# Patient Record
Sex: FEMALE | Race: WHITE | NOT HISPANIC OR LATINO | URBAN - METROPOLITAN AREA
[De-identification: names, ages, dates, MRNs, and addresses within clinical notes are randomized per-mention and may not be internally consistent; named-entity substitution may affect disease eponyms.]

---

## 2024-12-03 ENCOUNTER — OFFICE VISIT (OUTPATIENT)
Dept: URGENT CARE | Age: 34
End: 2024-12-03
Payer: COMMERCIAL

## 2024-12-03 VITALS
OXYGEN SATURATION: 95 % | HEART RATE: 81 BPM | BODY MASS INDEX: 36.71 KG/M2 | RESPIRATION RATE: 18 BRPM | DIASTOLIC BLOOD PRESSURE: 79 MMHG | HEIGHT: 60 IN | WEIGHT: 187 LBS | TEMPERATURE: 97.8 F | SYSTOLIC BLOOD PRESSURE: 127 MMHG

## 2024-12-03 DIAGNOSIS — J02.9 ACUTE PHARYNGITIS, UNSPECIFIED ETIOLOGY: Primary | ICD-10-CM

## 2024-12-03 DIAGNOSIS — J02.9 SORE THROAT: ICD-10-CM

## 2024-12-03 LAB
POC RAPID STREP: NEGATIVE
POC SARS-COV-2 AG BINAX: NORMAL

## 2024-12-03 RX ORDER — AMOXICILLIN 875 MG/1
875 TABLET, FILM COATED ORAL 2 TIMES DAILY
Qty: 20 TABLET | Refills: 0 | Status: SHIPPED | OUTPATIENT
Start: 2024-12-03 | End: 2024-12-13

## 2024-12-03 ASSESSMENT — ENCOUNTER SYMPTOMS
EYES NEGATIVE: 1
SORE THROAT: 1
COUGH: 1

## 2024-12-03 NOTE — PROGRESS NOTES
Subjective   Patient ID: Jennifer Perez is a 34 y.o. female. They present today with a chief complaint of Sore Throat (Sore throat,, fever, cough).    History of Present Illness    Sore Throat   Associated symptoms include coughing.       Past Medical History  Allergies as of 12/03/2024    (No Known Allergies)       (Not in a hospital admission)       History reviewed. No pertinent past medical history.    History reviewed. No pertinent surgical history.         Review of Systems  Review of Systems   HENT:  Positive for sore throat.    Eyes: Negative.    Respiratory:  Positive for cough.                                   Objective    Vitals:    12/03/24 0953   BP: 127/79   BP Location: Left arm   Patient Position: Sitting   BP Cuff Size: Adult   Pulse: 81   Resp: 18   Temp: 36.6 °C (97.8 °F)   SpO2: 95%   Weight: 84.8 kg (187 lb)   Height: 1.524 m (5')     No LMP recorded.    Physical Exam  Constitutional:       Appearance: Normal appearance.   HENT:      Right Ear: Tympanic membrane normal.      Left Ear: Tympanic membrane normal.      Mouth/Throat:      Pharynx: Pharyngeal swelling and posterior oropharyngeal erythema present.      Tonsils: Tonsillar exudate present. 1+ on the right. 1+ on the left.   Neurological:      Mental Status: She is alert.         Procedures    Point of Care Test & Imaging Results from this visit  Results for orders placed or performed in visit on 12/03/24   POCT BinaxNOW Covid-19 Ag Card manually resulted   Result Value Ref Range    POC CAROLE-COV-2 AG  Presumptive negative test for SARS-CoV-2 (no antigen detected)     Presumptive negative test for SARS-CoV-2 (no antigen detected)   POCT rapid strep A manually resulted   Result Value Ref Range    POC Rapid Strep Negative Negative      No results found.    Diagnostic study results (if any) were reviewed by Kelly Galicia MD.    Assessment/Plan   Allergies, medications, history, and pertinent labs/EKGs/Imaging reviewed by Kelly Galicia  MD.     Medical Decision Making      Orders and Diagnoses  Diagnoses and all orders for this visit:  Sore throat  -     POCT BinaxNOW Covid-19 Ag Card manually resulted  -     POCT rapid strep A manually resulted      Medical Admin Record      Patient disposition: Home    Electronically signed by Kelly Galicia MD  10:11 AM

## 2024-12-03 NOTE — LETTER
December 3, 2024     Patient: Jennifer Perez   YOB: 1990   Date of Visit: 12/3/2024       To Whom It May Concern:    Jennifer Perez was seen in my clinic on 12/3/2024 at 9:30 am. Please excuse Jennifer for her absence from work on this day to make the appointment.  She can return to work Friday 12/6/2024.    If you have any questions or concerns, please don't hesitate to call.         Sincerely,         Kelly Galicia MD        CC: No Recipients

## 2024-12-03 NOTE — PATIENT INSTRUCTIONS
Fluids   New tooth brush in 10  days   Take Probiotics and or eat yogurt for 2 weeks  Gargle with warm salt water

## 2025-03-21 ENCOUNTER — TELEPHONE (OUTPATIENT)
Dept: PRIMARY CARE | Facility: CLINIC | Age: 35
End: 2025-03-21

## 2025-03-21 ENCOUNTER — DOCUMENTATION (OUTPATIENT)
Dept: PRIMARY CARE | Facility: CLINIC | Age: 35
End: 2025-03-21

## 2025-03-21 ENCOUNTER — OFFICE VISIT (OUTPATIENT)
Dept: PRIMARY CARE | Facility: CLINIC | Age: 35
End: 2025-03-21
Payer: COMMERCIAL

## 2025-03-21 VITALS
DIASTOLIC BLOOD PRESSURE: 76 MMHG | BODY MASS INDEX: 38.48 KG/M2 | TEMPERATURE: 97 F | WEIGHT: 196 LBS | HEIGHT: 60 IN | SYSTOLIC BLOOD PRESSURE: 132 MMHG | OXYGEN SATURATION: 98 % | HEART RATE: 74 BPM

## 2025-03-21 DIAGNOSIS — Z71.85 VACCINE COUNSELING: ICD-10-CM

## 2025-03-21 DIAGNOSIS — Z13.6 SCREENING FOR CARDIOVASCULAR CONDITION: ICD-10-CM

## 2025-03-21 DIAGNOSIS — Z83.49 FAMILY HISTORY OF THYROID DISEASE IN MOTHER: ICD-10-CM

## 2025-03-21 DIAGNOSIS — Z23 ENCOUNTER FOR IMMUNIZATION: ICD-10-CM

## 2025-03-21 DIAGNOSIS — E55.9 VITAMIN D DEFICIENCY: ICD-10-CM

## 2025-03-21 DIAGNOSIS — Z00.00 ANNUAL PHYSICAL EXAM: Primary | ICD-10-CM

## 2025-03-21 DIAGNOSIS — Z30.09 ENCOUNTER FOR OTHER GENERAL COUNSELING AND ADVICE ON CONTRACEPTION: ICD-10-CM

## 2025-03-21 PROCEDURE — 90471 IMMUNIZATION ADMIN: CPT | Performed by: STUDENT IN AN ORGANIZED HEALTH CARE EDUCATION/TRAINING PROGRAM

## 2025-03-21 PROCEDURE — 99385 PREV VISIT NEW AGE 18-39: CPT | Performed by: STUDENT IN AN ORGANIZED HEALTH CARE EDUCATION/TRAINING PROGRAM

## 2025-03-21 PROCEDURE — 99203 OFFICE O/P NEW LOW 30 MIN: CPT | Performed by: STUDENT IN AN ORGANIZED HEALTH CARE EDUCATION/TRAINING PROGRAM

## 2025-03-21 PROCEDURE — 3008F BODY MASS INDEX DOCD: CPT | Performed by: STUDENT IN AN ORGANIZED HEALTH CARE EDUCATION/TRAINING PROGRAM

## 2025-03-21 PROCEDURE — 90715 TDAP VACCINE 7 YRS/> IM: CPT | Performed by: STUDENT IN AN ORGANIZED HEALTH CARE EDUCATION/TRAINING PROGRAM

## 2025-03-21 ASSESSMENT — PATIENT HEALTH QUESTIONNAIRE - PHQ9
SUM OF ALL RESPONSES TO PHQ QUESTIONS 1-9: 9
2. FEELING DOWN, DEPRESSED OR HOPELESS: SEVERAL DAYS
3. TROUBLE FALLING OR STAYING ASLEEP OR SLEEPING TOO MUCH: SEVERAL DAYS
5. POOR APPETITE OR OVEREATING: MORE THAN HALF THE DAYS
1. LITTLE INTEREST OR PLEASURE IN DOING THINGS: MORE THAN HALF THE DAYS
4. FEELING TIRED OR HAVING LITTLE ENERGY: SEVERAL DAYS
7. TROUBLE CONCENTRATING ON THINGS, SUCH AS READING THE NEWSPAPER OR WATCHING TELEVISION: SEVERAL DAYS
8. MOVING OR SPEAKING SO SLOWLY THAT OTHER PEOPLE COULD HAVE NOTICED. OR THE OPPOSITE, BEING SO FIGETY OR RESTLESS THAT YOU HAVE BEEN MOVING AROUND A LOT MORE THAN USUAL: NOT AT ALL
9. THOUGHTS THAT YOU WOULD BE BETTER OFF DEAD, OR OF HURTING YOURSELF: NOT AT ALL
SUM OF ALL RESPONSES TO PHQ9 QUESTIONS 1 AND 2: 3
6. FEELING BAD ABOUT YOURSELF - OR THAT YOU ARE A FAILURE OR HAVE LET YOURSELF OR YOUR FAMILY DOWN: SEVERAL DAYS

## 2025-03-21 ASSESSMENT — PAIN SCALES - GENERAL: PAINLEVEL_OUTOF10: 0-NO PAIN

## 2025-03-21 NOTE — TELEPHONE ENCOUNTER
Patient completed paperwork for Mirena today at office visit. Paperwork placed on PCP desk for signature to be sent out

## 2025-03-21 NOTE — PATIENT INSTRUCTIONS
Thank you for coming to see me today.    Tetanus booster (TDaP) in clinic today.    Go to the lab for fasting blood work, we will notify you with all results.    Please complete Mirena order form prior to leaving today.  We will call you when device has arrived and procedure appointment can be scheduled.    Follow up with me in the coming weeks for Pap smear, Mirena removal/insertion.

## 2025-03-21 NOTE — PROGRESS NOTES
Subjective   Jennifer Perez is a 34 y.o. female who presents for Annual Exam (NP Wellness exam - no complaints - mom has thyroid issues wants checked. No concerns just want checked ) and iud counseling (Has Mirena since 06/2020 - due to replace this year wants to discuss ordering that for replacement ).    HPI:      This is a 34-year-old female presenting as a new patient for establish care visit and physical exam.    PREVENTATIVE HEALTH CARE:    Immunizations:  COVID:  DUE  Flu:  DUE  TDaP:  DUE  Pneumonia:  not currently indicated  There is no immunization history for the selected administration types on file for this patient.    Screenings:  HIV/HCV:  DUE - agreeable  Pap:  no GYN exam since 2020.    STI:  Declines  Mammogram:  not currently indicated  Colonoscopy:  not currently indicated    Family History of Thyroid Disease:  Mother with hx of thyroid cancer.  No masses or neck fullness. Would like labs checked, asymptomatic.    Contraception counseling:  Mirena since 2020.  Would like replaced.  Has spotting randomly every few months but no regular periods.        ROS:    Review of systems is essentially negative for all systems except for any identified issues in HPI above.    Objective     /76   Pulse 74   Temp 36.1 °C (97 °F) (Temporal)   Ht 1.524 m (5')   Wt 88.9 kg (196 lb)   SpO2 98%   BMI 38.28 kg/m²      PHYSICAL EXAM    GENERAL  Well-appearing, pleasant and cooperative.  No acute distress.    HEENT  HEAD:   Normocephalic.  Atraumatic.  EYES:  PERRLA.  No scleral icterus or conjunctival injection.  EARS:  Tympanic membranes visualized bilaterally without erythema, fluid, or bulging.  NECK:  No adenopathy.  No palpable thyroid enlargement or nodules.    THROAT:  Moist oropharynx without tonsillar enlargement or exudates.    LUNGS:    Clear to auscultation bilaterally.  No wheezes, rales, rhonchi.    CARDIAC:  Regular rate and rhythm.  Normal S1S2.  No murmurs/rubs/gallops.    ABDOMEN:  Soft,  non-tender, non-distended.  No hepatosplenomegaly.  Normoactive bowel sounds.    MUSCULOSKELETAL:  No gross abnormalities.   No joint swelling or erythema,.  No spinal or paraspinal tenderness to palpation.    EXTREMITIES:  No LE edema or cyanosis.      NEURO           Alert and oriented x3. No focal deficits.    PSYCH:          Affect appropriate.           Assessment/Plan   Problem List Items Addressed This Visit    None  Visit Diagnoses       Annual physical exam    -  Primary    Relevant Orders    Hepatitis C Antibody    HIV 1/2 Antigen/Antibody Screen with Reflex to Confirmation    TSH with reflex to Free T4 if abnormal    Lipid Panel    CBC    Comprehensive Metabolic Panel    Screening for cardiovascular condition        Relevant Orders    Lipid Panel    Family history of thyroid disease in mother        Vitamin D deficiency        Relevant Orders    Vitamin D 25-Hydroxy,Total (for eval of Vitamin D levels)    Vaccine counseling        Relevant Orders    Tdap vaccine, age 7 years and older  (BOOSTRIX)    Encounter for immunization        Relevant Orders    Tdap vaccine, age 7 years and older  (BOOSTRIX)                 Kadie Lester MD

## 2025-03-22 LAB
25(OH)D3+25(OH)D2 SERPL-MCNC: 13 NG/ML (ref 30–100)
ALBUMIN SERPL-MCNC: 4.8 G/DL (ref 3.6–5.1)
ALP SERPL-CCNC: 80 U/L (ref 31–125)
ALT SERPL-CCNC: 25 U/L (ref 6–29)
ANION GAP SERPL CALCULATED.4IONS-SCNC: 10 MMOL/L (CALC) (ref 7–17)
AST SERPL-CCNC: 22 U/L (ref 10–30)
BILIRUB SERPL-MCNC: 0.7 MG/DL (ref 0.2–1.2)
BUN SERPL-MCNC: 9 MG/DL (ref 7–25)
CALCIUM SERPL-MCNC: 9.6 MG/DL (ref 8.6–10.2)
CHLORIDE SERPL-SCNC: 104 MMOL/L (ref 98–110)
CHOLEST SERPL-MCNC: 212 MG/DL
CHOLEST/HDLC SERPL: 3.9 (CALC)
CO2 SERPL-SCNC: 24 MMOL/L (ref 20–32)
CREAT SERPL-MCNC: 0.63 MG/DL (ref 0.5–0.97)
EGFRCR SERPLBLD CKD-EPI 2021: 119 ML/MIN/1.73M2
ERYTHROCYTE [DISTWIDTH] IN BLOOD BY AUTOMATED COUNT: 12.5 % (ref 11–15)
GLUCOSE SERPL-MCNC: 86 MG/DL (ref 65–99)
HCT VFR BLD AUTO: 44.7 % (ref 35–45)
HCV AB SERPL QL IA: NORMAL
HDLC SERPL-MCNC: 55 MG/DL
HGB BLD-MCNC: 15 G/DL (ref 11.7–15.5)
HIV 1+2 AB+HIV1 P24 AG SERPL QL IA: NORMAL
LDLC SERPL CALC-MCNC: 143 MG/DL (CALC)
MCH RBC QN AUTO: 30.4 PG (ref 27–33)
MCHC RBC AUTO-ENTMCNC: 33.6 G/DL (ref 32–36)
MCV RBC AUTO: 90.7 FL (ref 80–100)
NONHDLC SERPL-MCNC: 157 MG/DL (CALC)
PLATELET # BLD AUTO: 316 THOUSAND/UL (ref 140–400)
PMV BLD REES-ECKER: 10.4 FL (ref 7.5–12.5)
POTASSIUM SERPL-SCNC: 4.8 MMOL/L (ref 3.5–5.3)
PROT SERPL-MCNC: 7.5 G/DL (ref 6.1–8.1)
RBC # BLD AUTO: 4.93 MILLION/UL (ref 3.8–5.1)
SODIUM SERPL-SCNC: 138 MMOL/L (ref 135–146)
TRIGL SERPL-MCNC: 52 MG/DL
TSH SERPL-ACNC: 1.3 MIU/L
WBC # BLD AUTO: 9.4 THOUSAND/UL (ref 3.8–10.8)

## 2025-03-24 ENCOUNTER — TELEPHONE (OUTPATIENT)
Dept: PRIMARY CARE | Facility: CLINIC | Age: 35
End: 2025-03-24
Payer: COMMERCIAL

## 2025-03-24 DIAGNOSIS — E55.9 VITAMIN D DEFICIENCY: ICD-10-CM

## 2025-03-24 DIAGNOSIS — E55.9 VITAMIN D DEFICIENCY: Primary | ICD-10-CM

## 2025-03-24 RX ORDER — ERGOCALCIFEROL 1.25 MG/1
50000 CAPSULE ORAL
Qty: 12 CAPSULE | Refills: 0 | Status: SHIPPED | OUTPATIENT
Start: 2025-03-24 | End: 2025-03-24 | Stop reason: SDUPTHER

## 2025-03-24 RX ORDER — ERGOCALCIFEROL 1.25 MG/1
50000 CAPSULE ORAL
Qty: 12 CAPSULE | Refills: 0 | Status: SHIPPED | OUTPATIENT
Start: 2025-03-30 | End: 2025-06-22

## 2025-03-24 NOTE — TELEPHONE ENCOUNTER
"Rx sent, future lab ordered.    Kadie Lester MD  3/23/2025 10:54 PM EDT       VITAMIN D DEFICIENCY.  Recommend 12 week high dose supplement rx followed by repeat lab testing.  Will sign orders for Rx and future lab once pt notified/agreeable.     Lipid panel showing ELEVATED TOTAL and LDL (\"BAD\") CHOLESTEROL.  Recommend decreasing saturated fat/cholesterol intake, increasing dietary fiber, and regular physical activity/exercise.     Routine HIV/HCV screenings are negative.     TSH, CBC, CMP within normal limits.     "

## 2025-03-24 NOTE — TELEPHONE ENCOUNTER
Pt lvm stating she is calling in regards to test results stating she was told via vm she needs to take the vitamin d. Pt stating this is okay. [place] [facility]

## 2025-03-24 NOTE — TELEPHONE ENCOUNTER
Pt lvm stating she had an rx that was sent over for vitamin d stating this was sent to the wrong pharmacy asking if this could be sent to the [facility]  in [facility] or [facility] on [facility]     Please re send

## 2025-04-09 ENCOUNTER — TELEPHONE (OUTPATIENT)
Dept: PRIMARY CARE | Facility: CLINIC | Age: 35
End: 2025-04-09
Payer: COMMERCIAL

## 2025-04-09 NOTE — TELEPHONE ENCOUNTER
Pt was called to inform that mirena was delivered,no message left message to call back office to make appointment for placement

## 2025-06-06 ENCOUNTER — OFFICE VISIT (OUTPATIENT)
Dept: PRIMARY CARE | Facility: CLINIC | Age: 35
End: 2025-06-06
Payer: COMMERCIAL

## 2025-06-06 VITALS
OXYGEN SATURATION: 99 % | HEART RATE: 90 BPM | HEIGHT: 60 IN | BODY MASS INDEX: 38.87 KG/M2 | WEIGHT: 198 LBS | DIASTOLIC BLOOD PRESSURE: 82 MMHG | TEMPERATURE: 97.4 F | SYSTOLIC BLOOD PRESSURE: 130 MMHG

## 2025-06-06 DIAGNOSIS — Z11.3 ROUTINE SCREENING FOR STI (SEXUALLY TRANSMITTED INFECTION): ICD-10-CM

## 2025-06-06 DIAGNOSIS — A74.9 CHLAMYDIA INFECTION: ICD-10-CM

## 2025-06-06 DIAGNOSIS — Z30.433 ENCOUNTER FOR IUD REMOVAL AND REINSERTION: Primary | ICD-10-CM

## 2025-06-06 DIAGNOSIS — N92.0 SPOTTING: ICD-10-CM

## 2025-06-06 LAB — PREGNANCY TEST URINE, POC: NEGATIVE

## 2025-06-06 ASSESSMENT — PATIENT HEALTH QUESTIONNAIRE - PHQ9
1. LITTLE INTEREST OR PLEASURE IN DOING THINGS: SEVERAL DAYS
SUM OF ALL RESPONSES TO PHQ9 QUESTIONS 1 AND 2: 2
2. FEELING DOWN, DEPRESSED OR HOPELESS: SEVERAL DAYS

## 2025-06-06 ASSESSMENT — PAIN SCALES - GENERAL: PAINLEVEL_OUTOF10: 0-NO PAIN

## 2025-06-06 NOTE — PATIENT INSTRUCTIONS
IUD Insertion Aftercare Instructions      What to Expect:    You may experience some cramping, spotting, or light bleeding for a few days to a few weeks after insertion. This is normal.    Some women have mild lower back pain or discomfort.    Your periods may change in pattern, flow, or length over time.    Tips for the First Few Days:    Rest if you feel crampy or tired.    Use over-the-counter pain relief like ibuprofen or acetaminophen as needed for cramps.    Avoid heavy lifting or strenuous exercise for 24-48 hours.    Use a pad instead of tampons for any spotting or bleeding.    When to Contact Your Healthcare Provider Immediately:    Severe or worsening pelvic pain or cramping.    Heavy bleeding (soaking more than one pad per hour for 2 hours).    Fever or chills.    Foul-smelling vaginal discharge.    You think the IUD may have moved or been expelled (you can feel the hard part in the vagina or the strings are missing).    You suspect you might be pregnant.    Other Advice:    Use condoms if you are at risk of sexually transmitted infections (STIs).    Attend any scheduled follow-up appointments.    Your fertility returns immediately after removal if you choose to remove it.    Follow up in 1 month for IUD follow up/string check.

## 2025-06-06 NOTE — PROGRESS NOTES
Subjective   Jennifer Perez is a 34 y.o. female who presents for mirena.    HPI:    ***    ROS:   Review of systems is essentially negative for all systems except for any identified issues in HPI above.    Objective     /82   Pulse 90   Temp 36.3 °C (97.4 °F)   Ht 1.524 m (5')   Wt 89.8 kg (198 lb)   SpO2 99%   BMI 38.67 kg/m²      PHYSICAL EXAM    GENERAL  Well-appearing, pleasant and cooperative.  No acute distress.    HEENT  HEAD:   Normocephalic.  Atraumatic.  EYES:  PERRLA.  No scleral icterus or conjunctival injection.  EARS:  Tympanic membranes visualized bilaterally without erythema, fluid, or bulging.  NECK:  No adenopathy.  No palpable thyroid enlargement or nodules.    THROAT:  Moist oropharynx without tonsillar enlargement or exudates.    LUNGS:    Clear to auscultation bilaterally.  No wheezes, rales, rhonchi.    CARDIAC:  Regular rate and rhythm.  Normal S1S2.  No murmurs/rubs/gallops.    ABDOMEN:  Soft, non-tender, non-distended.  No hepatosplenomegaly.  Normoactive bowel sounds.    MUSCULOSKELETAL:  No gross abnormalities.   No joint swelling or erythema,.  No spinal or paraspinal tenderness to palpation.    EXTREMITIES:  No LE edema or cyanosis.      NEURO           Alert and oriented x3. No focal deficits.    PSYCH:          Affect appropriate.           Assessment/Plan   Problem List Items Addressed This Visit    None  Visit Diagnoses         Encounter for IUD removal and reinsertion    -  Primary    Relevant Medications    levonorgestrel (Mirena) 20 mcg/24hr IUD (Completed)    Other Relevant Orders    POCT Pregnancy, Urine manually resulted (Completed)    Vaginitis Gram Stain For Bacterial Vaginosis + Yeast    C. trachomatis / N. gonorrhoeae, Amplified, Urogenital (Completed)      Spotting        Relevant Orders    Vaginitis Gram Stain For Bacterial Vaginosis + Yeast      Routine screening for STI (sexually transmitted infection)        Relevant Orders    C. trachomatis / N.  gonorrhoeae, Amplified, Urogenital (Completed)                 Kadie Lester MD     Bacterial Vaginosis + Yeast      Routine screening for STI (sexually transmitted infection)        Relevant Orders    C. trachomatis / N. gonorrhoeae, Amplified, Urogenital (Completed)          Patient Instructions   IUD Insertion Aftercare Instructions      What to Expect:    You may experience some cramping, spotting, or light bleeding for a few days to a few weeks after insertion. This is normal.    Some women have mild lower back pain or discomfort.    Your periods may change in pattern, flow, or length over time.    Tips for the First Few Days:    Rest if you feel crampy or tired.    Use over-the-counter pain relief like ibuprofen or acetaminophen as needed for cramps.    Avoid heavy lifting or strenuous exercise for 24-48 hours.    Use a pad instead of tampons for any spotting or bleeding.    When to Contact Your Healthcare Provider Immediately:    Severe or worsening pelvic pain or cramping.    Heavy bleeding (soaking more than one pad per hour for 2 hours).    Fever or chills.    Foul-smelling vaginal discharge.    You think the IUD may have moved or been expelled (you can feel the hard part in the vagina or the strings are missing).    You suspect you might be pregnant.    Other Advice:    Use condoms if you are at risk of sexually transmitted infections (STIs).    Attend any scheduled follow-up appointments.    Your fertility returns immediately after removal if you choose to remove it.    Follow up in 1 month for IUD follow up/string check.     Counseling:   Medication education:    Education: The patient is counseled regarding potential side effects of all new medications.    Understanding:  Patient expressed understanding.    Adherence:  No barriers to adherence identified.    Time Spent  Prep time on day of patient encounter: 5 minutes  Time spent directly with patient, family or caregiver: 35 minutes  Additional Time Spent on Patient Care Activities: 5 minutes  Documentation Time: 3 minutes  Other  Time Spent: 0 minutes  Total: 48 minutes        Kadie Lester MD

## 2025-06-07 LAB
C TRACH RRNA SPEC QL NAA+PROBE: DETECTED
N GONORRHOEA RRNA SPEC QL NAA+PROBE: NOT DETECTED
QUEST GC CT AMPLIFIED (ALWAYS MESSAGE): ABNORMAL

## 2025-06-07 RX ORDER — DOXYCYCLINE 100 MG/1
100 CAPSULE ORAL 2 TIMES DAILY
Qty: 14 CAPSULE | Refills: 0 | Status: SHIPPED | OUTPATIENT
Start: 2025-06-07 | End: 2025-06-14

## 2025-06-08 LAB — BV SCORE VAG QL: NORMAL

## 2025-06-16 DIAGNOSIS — E55.9 VITAMIN D DEFICIENCY: ICD-10-CM

## 2025-06-16 RX ORDER — ERGOCALCIFEROL 1.25 MG/1
1.25 CAPSULE ORAL
Qty: 12 CAPSULE | Refills: 0 | OUTPATIENT
Start: 2025-06-16

## 2025-06-16 NOTE — TELEPHONE ENCOUNTER
Refill request declined.  After completion of initial 12-week high-dose supplement course prescribed in March, needs to go to the lab for repeat vitamin D level testing to see if high-dose supplementation is still needed.  Lab order entered previously.

## 2025-06-19 ENCOUNTER — PATIENT MESSAGE (OUTPATIENT)
Dept: PRIMARY CARE | Facility: CLINIC | Age: 35
End: 2025-06-19
Payer: COMMERCIAL

## 2025-06-19 DIAGNOSIS — A74.9 CHLAMYDIA INFECTION: Primary | ICD-10-CM

## 2025-06-19 RX ORDER — DOXYCYCLINE 100 MG/1
100 CAPSULE ORAL 2 TIMES DAILY
Qty: 14 CAPSULE | Refills: 0 | Status: SHIPPED | OUTPATIENT
Start: 2025-06-19 | End: 2025-06-26

## 2025-06-24 DIAGNOSIS — E55.9 VITAMIN D DEFICIENCY: ICD-10-CM

## 2025-06-28 LAB — 25(OH)D3+25(OH)D2 SERPL-MCNC: 46 NG/ML (ref 30–100)

## 2025-07-11 ENCOUNTER — OFFICE VISIT (OUTPATIENT)
Dept: PRIMARY CARE | Facility: CLINIC | Age: 35
End: 2025-07-11
Payer: COMMERCIAL

## 2025-07-11 VITALS
BODY MASS INDEX: 38.28 KG/M2 | HEART RATE: 85 BPM | WEIGHT: 195 LBS | DIASTOLIC BLOOD PRESSURE: 82 MMHG | SYSTOLIC BLOOD PRESSURE: 120 MMHG | OXYGEN SATURATION: 99 % | HEIGHT: 60 IN | TEMPERATURE: 96.6 F

## 2025-07-11 DIAGNOSIS — Z30.431 INTRAUTERINE DEVICE SURVEILLANCE: Primary | ICD-10-CM

## 2025-07-11 DIAGNOSIS — Z86.19 HISTORY OF CHLAMYDIA INFECTION: ICD-10-CM

## 2025-07-11 PROCEDURE — 3008F BODY MASS INDEX DOCD: CPT | Performed by: STUDENT IN AN ORGANIZED HEALTH CARE EDUCATION/TRAINING PROGRAM

## 2025-07-11 PROCEDURE — 99214 OFFICE O/P EST MOD 30 MIN: CPT | Performed by: STUDENT IN AN ORGANIZED HEALTH CARE EDUCATION/TRAINING PROGRAM

## 2025-07-11 RX ORDER — CHOLECALCIFEROL (VITAMIN D3) 25 MCG
25 TABLET ORAL DAILY
COMMUNITY

## 2025-07-11 ASSESSMENT — PATIENT HEALTH QUESTIONNAIRE - PHQ9
SUM OF ALL RESPONSES TO PHQ9 QUESTIONS 1 AND 2: 0
1. LITTLE INTEREST OR PLEASURE IN DOING THINGS: NOT AT ALL
2. FEELING DOWN, DEPRESSED OR HOPELESS: NOT AT ALL

## 2025-07-11 ASSESSMENT — PAIN SCALES - GENERAL: PAINLEVEL_OUTOF10: 0-NO PAIN

## 2025-07-11 NOTE — PATIENT INSTRUCTIONS
Thank you for coming to see me today.    We will contact you with results of midi and gonorrhea testing performed in clinic today.    Your IUD remains in place and without concerns on evaluation today.  Will leave your strings as is for now, they are long enough to be cut in the future if needed if they start bothering you or your partner.    Follow-up with me in March 2026 for YEARLY PHYSICAL.

## 2025-07-11 NOTE — PROGRESS NOTES
Subjective   Jennifer Perez is a 34 y.o. female who presents for iud.    HPI:      IUD String Check:  S/p Mirena removal and reinsertion by me on 6/6/2025.  Doing well, no concerns.  Had small amount of bleeding for a couple days after insertion, no bleeding since.  Felt by herself or her partner.  Has no concerns regarding stealing today.  .    History of Chlamydia Infection:  Testing performed prior to IUD insertion was positive for chlamydia infection.  He was notified of this result the day following IUD insertion and doxycycline was sent.  Subsequently received [employer] message from patient on 6/19/2025 stating that she went out of town and did not complete the full course of this antibiotic.  A second full course of doxycycline 100 mg twice daily was sent to her pharmacy on 6/19/2025.    Discussed chlamydia infection with her partner who also tested positive.  He has also been fully treated.  She is agreeable to repeat testing for chlamydia today, but declines other STI screening.      C. trachomatis / N. gonorrhoeae, Amplified, Urogenital: [id number]  Collected 6/6/2025 13:12    Status: Final result           Component  Ref Range & Units    CHLAMYDIA TRACHOMATIS RNA, TMA, UROGENITAL  NOT DETECTED DETECTED Abnormal    C   NEISSERIA GONORRHOEAE RNA, TMA, UROGENITAL  NOT DETECTED NOT DETECTED          ROS:   Review of systems is essentially negative for all systems except for any identified issues in HPI above.    Objective     /82   Pulse 85   Temp 35.9 °C (96.6 °F)   Ht (!) 1.524 m (5')   Wt 88.5 kg (195 lb)   SpO2 99%   BMI 38.08 kg/m²      PHYSICAL EXAM    GENERAL  Well-appearing, pleasant and cooperative.  No acute distress.    HEENT  HEAD:   Normocephalic.  Atraumatic.  EYES:  No scleral icterus or conjunctival injection.  NECK:  No adenopathy.  No palpable thyroid enlargement or nodules.    THROAT:  Moist oropharynx without tonsillar enlargement or exudates.    LUNGS:    Clear to auscultation  bilaterally.  No wheezes, rales, rhonchi.    CARDIAC:  Regular rate and rhythm.  Normal S1S2.  No murmurs/rubs/gallops.    ABDOMEN:  Soft, non-tender, non-distended.  No hepatosplenomegaly.  Normoactive bowel sounds.    :  Normal external genitalia.  Cervix visualzed on speculum exam with 2 black IUD strings -approx 3 cm in length extending from the external os.     MUSCULOSKELETAL:  No gross abnormalities.      EXTREMITIES:  No LE edema or cyanosis.      NEURO           Alert and oriented x3. No focal deficits.    PSYCH:          Affect appropriate.           Assessment/Plan   Problem List Items Addressed This Visit    None  Visit Diagnoses         Intrauterine device surveillance    -  Primary      History of chlamydia infection        Relevant Orders    C. trachomatis / N. gonorrhoeae, Amplified, Urogenital (Completed)          Patient Instructions   Thank you for coming to see me today.    We will contact you with results of midi and gonorrhea testing performed in clinic today.    Your IUD remains in place and without concerns on evaluation today.  Will leave your strings as is for now, they are long enough to be cut in the future if needed if they start bothering you or your partner.    Follow-up with me in March 2026 for YEARLY PHYSICAL.    Counseling:   Medication education:    Education: The patient is counseled regarding potential side effects of all new medications.    Understanding:  Patient expressed understanding.    Adherence:  No barriers to adherence identified.    Time Spent  Prep time on day of patient encounter: 5 minutes  Time spent directly with patient, family or caregiver: 20 minutes  Additional Time Spent on Patient Care Activities: 5 minutes  Documentation Time: 3 minutes  Other Time Spent: 0 minutes  Total: 33 minutes        Kadie Lester MD

## 2025-07-12 LAB
C TRACH RRNA SPEC QL NAA+PROBE: NOT DETECTED
N GONORRHOEA RRNA SPEC QL NAA+PROBE: NOT DETECTED
QUEST GC CT AMPLIFIED (ALWAYS MESSAGE): NORMAL

## 2025-07-27 ENCOUNTER — OFFICE VISIT (OUTPATIENT)
Dept: URGENT CARE | Age: 35
End: 2025-07-27
Payer: COMMERCIAL

## 2025-07-27 VITALS
OXYGEN SATURATION: 96 % | TEMPERATURE: 97.5 F | RESPIRATION RATE: 18 BRPM | SYSTOLIC BLOOD PRESSURE: 109 MMHG | DIASTOLIC BLOOD PRESSURE: 76 MMHG | BODY MASS INDEX: 39.31 KG/M2 | HEART RATE: 97 BPM | WEIGHT: 195 LBS | HEIGHT: 59 IN

## 2025-07-27 DIAGNOSIS — J02.9 SORE THROAT: ICD-10-CM

## 2025-07-27 DIAGNOSIS — J02.9 VIRAL PHARYNGITIS: Primary | ICD-10-CM

## 2025-07-27 LAB
POC HUMAN RHINOVIRUS PCR: NEGATIVE
POC INFLUENZA A VIRUS PCR: NEGATIVE
POC INFLUENZA B VIRUS PCR: NEGATIVE
POC RESPIRATORY SYNCYTIAL VIRUS PCR: NEGATIVE
POC STREPTOCOCCUS PYOGENES (GROUP A STREP) PCR: NEGATIVE

## 2025-07-27 PROCEDURE — 99212 OFFICE O/P EST SF 10 MIN: CPT | Performed by: PHYSICIAN ASSISTANT

## 2025-07-27 PROCEDURE — 1036F TOBACCO NON-USER: CPT | Performed by: PHYSICIAN ASSISTANT

## 2025-07-27 PROCEDURE — 3008F BODY MASS INDEX DOCD: CPT | Performed by: PHYSICIAN ASSISTANT

## 2025-07-27 PROCEDURE — 87631 RESP VIRUS 3-5 TARGETS: CPT | Performed by: PHYSICIAN ASSISTANT

## 2025-07-27 PROCEDURE — 87651 STREP A DNA AMP PROBE: CPT | Performed by: PHYSICIAN ASSISTANT

## 2025-07-27 ASSESSMENT — ENCOUNTER SYMPTOMS: SORE THROAT: 1

## 2025-07-27 NOTE — PATIENT INSTRUCTIONS
"Sore throat in adults    The Basics  Written by the doctors and editors at [facility]  What causes sore throat?  Sore throat is usually caused by an infection. Two types of germs can cause it: viruses and bacteria.  People who have a sore throat caused by a virus do not usually need to see a doctor or nurse. But if you think that you might have been exposed to COVID-19, or if COVID-19 is common where you live, ask your doctor or nurse if you should be tested.  People who have a sore throat caused by bacteria might need to see a doctor or nurse. They might have a type of infection called strep throat. Only about 1 in 10 adults who seek medical care for sore throat have strep throat.  Is my sore throat is caused by a virus or strep throat?  It is hard to tell the difference. But there are some clues to look for.  People who have a sore throat caused by a virus usually have other symptoms, such as:  ? Stuffy or runny nose  ? Itchy or red eyes  ? Cough  People who have COVID-19 can have a sore throat as their only symptom. Or they can have other symptoms such as fever, cough, trouble breathing, and problems with their sense of smell or taste. In most cases, the only way to know for sure if you have COVID-19 is to get tested.  People who have strep throat do not usually have a cough, runny nose, or itchy or red eyes. They might have:  ? Severe throat pain  ? Fever (temperature higher than 100.4°F, or 38°C)  ? Swollen glands in the neck  If you think that you have strep throat, the doctor or nurse can easily check. They can take a sample from the back of your throat and test it for the bacteria that cause strep throat.  Do I need antibiotics?  If you have an infection caused by a virus, you do not need antibiotics. Some people with COVID-19 benefit from \"antiviral\" medicine.  If you have strep throat, you should get antibiotics. Most people with strep throat get better without antibiotics, but doctors and nurses often " prescribe them. This is because antibiotics often can prevent other problems that might be caused by strep throat. Plus, they can reduce the symptoms of strep throat and prevent you from spreading it to other people.  What can I do to feel better?  To relieve the pain of sore throat, you can:  ? Take over-the-counter pain medicine - Acetaminophen (sample brand name: Tylenol) or ibuprofen (sample brand names: Advil, Motrin) can help with throat pain.  ? Use medicated sore throat lozenges or sprays - These can temporarily reduce throat pain.  ? Suck on hard candies, ice chips, or ice pops.  ? Gargle with salt water - This can sometimes help with throat pain.  ? Use a cool mist humidifier - This adds moisture to the air. Some people find that this helps.  ? Avoid smoking or being around people who are smoking - Smoke can make throat pain worse.  When can I go back to work or school?  If you have strep throat, wait 1 day after starting antibiotics. By then, you will be a lot less likely to spread the infection to others.  If you have COVID-19, stay home from work or school while you are sick. Do not go to work or school until your fever has been gone for at least 24 hours without taking medicine such as acetaminophen.  If you have a sore throat that is not due to strep throat or COVID-19, you can go back to your usual activities as soon as you feel well. But it's still important to wash your hands often and cover your mouth if you cough.  When should I call the doctor?  Call your doctor or nurse if:  ? You have a fever of at least 101°F (38.4°C).  ? Your throat pain is severe within the first 2 days, or does not start to improve within 5 to 7 days.  ? You are having trouble getting enough to eat or drink.  ? You got antibiotics but still have symptoms after finishing them.  Call for an ambulance (in the [place] and [place], call 9-1-1) or go to the emergency department if you:  ? Have trouble breathing  ? Are drooling because  you cannot swallow your saliva  ? Have swelling of your neck or tongue  ? Cannot move your neck, or have trouble opening your mouth  How can I prevent getting a sore throat again?  Wash your hands often with soap and water (figure 1). It is one of the best ways to prevent the spread of infection.  All topics are updated as new evidence becomes available and our peer review process is complete.